# Patient Record
Sex: MALE | Race: OTHER
[De-identification: names, ages, dates, MRNs, and addresses within clinical notes are randomized per-mention and may not be internally consistent; named-entity substitution may affect disease eponyms.]

---

## 2022-01-12 ENCOUNTER — HOSPITAL ENCOUNTER (EMERGENCY)
Dept: HOSPITAL 61 - ER | Age: 47
Discharge: HOME | End: 2022-01-12
Payer: SELF-PAY

## 2022-01-12 VITALS — BODY MASS INDEX: 24.97 KG/M2 | WEIGHT: 127.21 LBS | HEIGHT: 60 IN

## 2022-01-12 VITALS — SYSTOLIC BLOOD PRESSURE: 136 MMHG | DIASTOLIC BLOOD PRESSURE: 81 MMHG

## 2022-01-12 DIAGNOSIS — K59.00: Primary | ICD-10-CM

## 2022-01-12 DIAGNOSIS — E11.9: ICD-10-CM

## 2022-01-12 LAB
ALBUMIN SERPL-MCNC: 3.6 G/DL (ref 3.4–5)
ALBUMIN/GLOB SERPL: 1 {RATIO} (ref 1–1.7)
ALP SERPL-CCNC: 109 U/L (ref 46–116)
ALT SERPL-CCNC: 45 U/L (ref 16–63)
ANION GAP SERPL CALC-SCNC: 15 MMOL/L (ref 6–14)
APTT PPP: YELLOW S
AST SERPL-CCNC: 21 U/L (ref 15–37)
BACTERIA #/AREA URNS HPF: (no result) /HPF
BASE EXCESS ABG: -3 MMOL/L (ref -3–3)
BASOPHILS # BLD AUTO: 0 X10^3/UL (ref 0–0.2)
BASOPHILS NFR BLD: 0 % (ref 0–3)
BILIRUB SERPL-MCNC: 0.7 MG/DL (ref 0.2–1)
BILIRUB UR QL STRIP: NEGATIVE
BUN SERPL-MCNC: 16 MG/DL (ref 8–26)
BUN/CREAT SERPL: 32 (ref 6–20)
CALCIUM SERPL-MCNC: 8.2 MG/DL (ref 8.5–10.1)
CHLORIDE SERPL-SCNC: 98 MMOL/L (ref 98–107)
CO2 SERPL-SCNC: 25 MMOL/L (ref 21–32)
CREAT SERPL-MCNC: 0.5 MG/DL (ref 0.7–1.3)
EOSINOPHIL NFR BLD: 0.1 X10^3/UL (ref 0–0.7)
EOSINOPHIL NFR BLD: 2 % (ref 0–3)
ERYTHROCYTE [DISTWIDTH] IN BLOOD BY AUTOMATED COUNT: 13.2 % (ref 11.5–14.5)
FIBRINOGEN PPP-MCNC: CLEAR MG/DL
GFR SERPLBLD BASED ON 1.73 SQ M-ARVRAT: 179.8 ML/MIN
GLUCOSE SERPL-MCNC: 248 MG/DL (ref 70–99)
HCO3 BLDA-SCNC: 21 MMOL/L (ref 21–28)
HCT VFR BLD CALC: 47.5 % (ref 39–53)
HGB BLD-MCNC: 16.6 G/DL (ref 13–17.5)
INSPIRATION SETTING TIME VENT: 21
LIPASE: 58 U/L (ref 73–393)
LYMPHOCYTES # BLD: 1.1 X10^3/UL (ref 1–4.8)
LYMPHOCYTES NFR BLD AUTO: 25 % (ref 24–48)
MCH RBC QN AUTO: 33 PG (ref 25–35)
MCHC RBC AUTO-ENTMCNC: 35 G/DL (ref 31–37)
MCV RBC AUTO: 93 FL (ref 79–100)
MONO #: 0.4 X10^3/UL (ref 0–1.1)
MONOCYTES NFR BLD: 8 % (ref 0–9)
NEUT #: 2.9 X10^3/UL (ref 1.8–7.7)
NEUTROPHILS NFR BLD AUTO: 65 % (ref 31–73)
NITRITE UR QL STRIP: NEGATIVE
PCO2 BLDA: 35 MMHG (ref 35–46)
PH UR STRIP: 6 [PH]
PLATELET # BLD AUTO: 196 X10^3/UL (ref 140–400)
PO2 BLDA: 100 MMHG (ref 75–108)
POTASSIUM SERPL-SCNC: 4 MMOL/L (ref 3.5–5.1)
PROT SERPL-MCNC: 7.2 G/DL (ref 6.4–8.2)
PROT UR STRIP-MCNC: NEGATIVE MG/DL
RBC # BLD AUTO: 5.09 X10^6/UL (ref 4.3–5.7)
RBC #/AREA URNS HPF: 0 /HPF (ref 0–2)
SAO2 % BLDA: 96 % (ref 92–99)
SODIUM SERPL-SCNC: 138 MMOL/L (ref 136–145)
UROBILINOGEN UR-MCNC: 1 MG/DL
WBC # BLD AUTO: 4.5 X10^3/UL (ref 4–11)
WBC #/AREA URNS HPF: (no result) /HPF (ref 0–4)

## 2022-01-12 PROCEDURE — 36415 COLL VENOUS BLD VENIPUNCTURE: CPT

## 2022-01-12 PROCEDURE — 80053 COMPREHEN METABOLIC PANEL: CPT

## 2022-01-12 PROCEDURE — 82805 BLOOD GASES W/O2 SATURATION: CPT

## 2022-01-12 PROCEDURE — 36600 WITHDRAWAL OF ARTERIAL BLOOD: CPT

## 2022-01-12 PROCEDURE — 99285 EMERGENCY DEPT VISIT HI MDM: CPT

## 2022-01-12 PROCEDURE — 96374 THER/PROPH/DIAG INJ IV PUSH: CPT

## 2022-01-12 PROCEDURE — 85025 COMPLETE CBC W/AUTO DIFF WBC: CPT

## 2022-01-12 PROCEDURE — 81001 URINALYSIS AUTO W/SCOPE: CPT

## 2022-01-12 PROCEDURE — 83690 ASSAY OF LIPASE: CPT

## 2022-01-12 PROCEDURE — 74177 CT ABD & PELVIS W/CONTRAST: CPT

## 2022-01-12 PROCEDURE — 96361 HYDRATE IV INFUSION ADD-ON: CPT

## 2022-01-12 NOTE — ED.ADGEN
General Adult


EDM:


Chief Complaint:  ABDOMINAL PAIN





HPI:


HPI:





Patient is a 46  year old male coming in for right lower quadrant pain that 

started about 2 weeks ago.  Patient states the pain is gradually gotten worse.  

Is worse with palpation and movement.  Denies any nausea, vomiting, diarrhea or 

fevers.  Denies any changes of urination or hematuria.  Denies any medical or 

surgical history.  Last p.o. intake was water around 10 hours prior to 

evaluation, last solid intake 16 hours prior.





Review of Systems:


Review of Systems:


All other systems within normal limits except for as noted in the HPI





Current Medications:





Current Medications








 Medications


  (Trade)  Dose


 Ordered  Sig/Virgilio  Start Time


 Stop Time Status Last Admin


Dose Admin


 


 Info


  (CONTRAST GIVEN


 -- Rx MONITORING)  1 each  PRN DAILY  PRN  22 10:45


 22 10:44   





 


 Iohexol


  (Omnipaque 300


 Mg/ml)  75 ml  1X  ONCE  22 10:45


 22 10:46 DC 22 10:47


75 ML


 


 Ketorolac


 Tromethamine


  (Toradol 30mg


 Vial)  15 mg  1X  ONCE  22 10:30


 22 10:31 DC 22 10:30


15 MG


 


 Sodium Chloride  1,000 ml @ 


 1,000 mls/hr  1X  ONCE  22 10:45


 22 11:44 DC 22 11:54


1,000 MLS/HR











Allergies:


Allergies:





Allergies








Coded Allergies Type Severity Reaction Last Updated Verified


 


  No Known Drug Allergies    22 No











Physical Exam:


PE:


Constitutional: Well developed, well nourished, no acute distress, non-toxic 

appearance. []


HENT: Normocephalic, atraumatic, bilateral external ears normal,  nose normal. 

[]


Eyes: PERRLA, conjunctiva normal, no discharge. [] 


Neck: No rigidity, supple, no stridor. [] 


Cardiovascular: Regular rate and rhythm, brisk cap refill []


Lungs & Thorax: Non labored symmetric respirations, no tachypnea or respiratory 

distress []


Abdomen: Soft, nondistended, but Jean Paul's point tenderness, negative Kiran's, 

guarding.


Skin: Warm, dry, no erythema, no rash. [] 


Back: Unremarkable


Extremities: No deformities, range of motion grossly intact, no lower extremity 

edema [] 


Neurologic: Alert and oriented X 3, no focal deficits noted. []


Psychologic: Affect normal, judgement normal, mood normal. []





Current Patient Data:


Labs:





                                Laboratory Tests








Test


 22


10:00 22


10:02 22


12:40


 


Urine Collection Type Unknown    


 


Urine Color Yellow    


 


Urine Clarity Clear    


 


Urine pH


 6.0 (<5.0-8.0)


 


 





 


Urine Specific Gravity


 >=1.030


(1.000-1.030) 


 





 


Urine Protein


 Negative mg/dL


(NEG-TRACE) 


 





 


Urine Glucose (UA)


 >=1000 mg/dL


(NEG) 


 





 


Urine Ketones (Stick)


 >=80 mg/dL


(NEG) 


 





 


Urine Blood


 Negative (NEG)


 


 





 


Urine Nitrite


 Negative (NEG)


 


 





 


Urine Bilirubin


 Negative (NEG)


 


 





 


Urine Urobilinogen Dipstick


 1.0 mg/dL (0.2


mg/dL) 


 





 


Urine Leukocyte Esterase


 Negative (NEG)


 


 





 


Urine RBC 0 /HPF (0-2)    


 


Urine WBC


 1-4 /HPF (0-4)


 


 





 


Urine Squamous Epithelial


Cells Few /LPF  


 


 





 


Urine Bacteria


 Few /HPF


(0-FEW) 


 





 


White Blood Count


 


 4.5 x10^3/uL


(4.0-11.0) 





 


Red Blood Count


 


 5.09 x10^6/uL


(4.30-5.70) 





 


Hemoglobin


 


 16.6 g/dL


(13.0-17.5) 





 


Hematocrit


 


 47.5 %


(39.0-53.0) 





 


Mean Corpuscular Volume


 


 93 fL ()


 





 


Mean Corpuscular Hemoglobin  33 pg (25-35)   


 


Mean Corpuscular Hemoglobin


Concent 


 35 g/dL


(31-37) 





 


Red Cell Distribution Width


 


 13.2 %


(11.5-14.5) 





 


Platelet Count


 


 196 x10^3/uL


(140-400) 





 


Neutrophils (%) (Auto)  65 % (31-73)   


 


Lymphocytes (%) (Auto)  25 % (24-48)   


 


Monocytes (%) (Auto)  8 % (0-9)   


 


Eosinophils (%) (Auto)  2 % (0-3)   


 


Basophils (%) (Auto)  0 % (0-3)   


 


Neutrophils # (Auto)


 


 2.9 x10^3/uL


(1.8-7.7) 





 


Lymphocytes # (Auto)


 


 1.1 x10^3/uL


(1.0-4.8) 





 


Monocytes # (Auto)


 


 0.4 x10^3/uL


(0.0-1.1) 





 


Eosinophils # (Auto)


 


 0.1 x10^3/uL


(0.0-0.7) 





 


Basophils # (Auto)


 


 0.0 x10^3/uL


(0.0-0.2) 





 


Sodium Level


 


 138 mmol/L


(136-145) 





 


Potassium Level


 


 4.0 mmol/L


(3.5-5.1) 





 


Chloride Level


 


 98 mmol/L


() 





 


Carbon Dioxide Level


 


 25 mmol/L


(21-32) 





 


Anion Gap  15 (6-14)  H 


 


Blood Urea Nitrogen


 


 16 mg/dL


(8-26) 





 


Creatinine


 


 0.5 mg/dL


(0.7-1.3)  L 





 


Estimated GFR


(Cockcroft-Gault) 


 179.8  


 





 


BUN/Creatinine Ratio  32 (6-20)  H 


 


Glucose Level


 


 248 mg/dL


(70-99)  H 





 


Calcium Level


 


 8.2 mg/dL


(8.5-10.1)  L 





 


Total Bilirubin


 


 0.7 mg/dL


(0.2-1.0) 





 


Aspartate Amino Transferase


(AST) 


 21 U/L (15-37)


 





 


Alanine Aminotransferase (ALT)


 


 45 U/L (16-63)


 





 


Alkaline Phosphatase


 


 109 U/L


() 





 


Total Protein


 


 7.2 g/dL


(6.4-8.2) 





 


Albumin


 


 3.6 g/dL


(3.4-5.0) 





 


Albumin/Globulin Ratio  1.0 (1.0-1.7)   


 


Lipase


 


 58 U/L


()  L 





 


O2 Saturation   96 % (92-99)  


 


Arterial Blood pH


 


 


 7.40


(7.35-7.45)


 


Arterial Blood pCO2 at


Patient Temp 


 


 35 mmHg


(35-46)


 


Arterial Blood pO2 at Patient


Temp 


 


 100 mmHg


()


 


Arterial Blood HCO3


 


 


 21 mmol/L


(21-28)


 


Arterial Blood Base Excess


 


 


 -3 mmol/L


(-3-3)


 


FiO2   21  





                                Laboratory Tests


22 10:02








                                Laboratory Tests


22 10:02








Vital Signs:





                                   Vital Signs








  Date Time  Temp Pulse Resp B/P (MAP) Pulse Ox O2 Delivery O2 Flow Rate FiO2


 


22 09:50 97.8 87 14 157/90 (112) 100 Room Air  





 97.8       











EKG:


EKG:


[]





Heart Score:


C/O Chest Pain:  No


Risk Factors:


Risk Factors:  DM, Current or recent (<one month) smoker, HTN, HLP, family 

history of CAD, obesity.


Risk Scores:


Score 0 - 3:  2.5% MACE over next 6 weeks - Discharge Home


Score 4 - 6:  20.3% MACE over next 6 weeks - Admit for Clinical Observation


Score 7 - 10:  72.7% MACE over next 6 weeks - Early Invasive Strategies





Radiology/Procedures:


Radiology/Procedures:


[facility]


                    [address] [place] [address]


                                 [phone]


                                        


                                 IMAGING REPORT





                                     Signed





PATIENT: LUCIANA ELENA RACCOUNT: [id number]     MRN#: [id number]


: 1975           LOCATION: ER              AGE: 46


SEX: M                    EXAM DT: 22         ACCESSION#: [id number]


STATUS: REG ER            ORD. PHYSICIAN: GEORGE LOVE MD


REASON: RLQ pain


PROCEDURE: CT ABD PELV W/ IV CONTRST ONLY





Exam Date:  2022 10:33 AM





CT ABDOMEN+PELVIS W





Indication: Reason: RLQ pain / Spl. Instructions: omni 300 75ml / History: .





TECHNIQUE:  CT examination of the abdomen and pelvis was performed following the

 administration of nonionic intravenous contrast.  One or more of the following 

dose reduction techniques were utilized:


*Automated exposure control (AEC)


*Adjustment of mA and/or kV according to patient size


*Use of iterative reconstruction technique


*CT scan done according to ALARA, or ALARA/IMAGE GENTLY





FINDINGS: 





The visualized lung bases are clear.    





The liver, gallbladder, spleen, pancreas, adrenal glands and kidneys are normal.





Urinary bladder is normal in appearance.  





There is no bowel obstruction or inflammation.  The appendix is normal.  





Mild atherosclerotic calcifications are seen.  No lymphadenopathy or ascites is 

seen.  





Degenerative changes are seen in the spine.





IMPRESSION: 





No evidence of acute intra-abdominal pathology.  





Electronically signed by: Tete Cabrera MD (2022 11:01 AM) [facility]














DICTATED and SIGNED BY:     TETE CABRERA MD


DATE:     22 [id number] 0


[]





Course & Med Decision Making:


Course & Med Decision Making


Pertinent Labs and Imaging studies reviewed. (See chart for details)


Patient has fasting blood sugar of 250, discussed new onset diabetes with 

patient will start metformin.  Given information had a follow-up with clinics.  

CT unremarkable but has large stool burden in the ascending colon, could be 

contributing to patient's right lower quadrant pain.


[]





Dragon Disclaimer:


Dragon Disclaimer:


This electronic medical record was generated, in whole or in part, using a voice

 recognition dictation system.





Departure


Departure


Impression:  


   Primary Impression:  


   Diabetes mellitus, new onset


   Additional Impression:  


   Constipation


Disposition:   HOME / SELF CARE / HOMELESS


Condition:  STABLE


Referrals:  


NO PCP (PCP)


Patient Instructions:  Abdominal Pain, Possible Early Appendicitis, Metformin 

tablets


Scripts


Docusate Sodium (DOCUSATE SODIUM) 100 Mg Capsule


1 CAP PO BID for constipation for 7 Days, #14 CAP 0 Refills


   Prov: GEORGE LOVE MD         22 


Metformin Hcl (METFORMIN HCL) 500 Mg Tablet


500 MG PO BIDWMEALS for ANTI-DIABETIC, #60 TAB 0 Refills


   Prov: GEORGE LOVE MD         22





Problem Qualifiers











GEORGE LOVE MD            2022 10:08

## 2022-01-12 NOTE — RAD
Exam Date:  1/12/2022 10:33 AM



CT ABDOMEN+PELVIS W



Indication: Reason: RLQ pain / Spl. Instructions: omni 300 75ml / History: .



TECHNIQUE:  CT examination of the abdomen and pelvis was performed following the administration of no
nionic intravenous contrast.  One or more of the following dose reduction techniques were utilized:

*Automated exposure control (AEC)

*Adjustment of mA and/or kV according to patient size

*Use of iterative reconstruction technique

*CT scan done according to ALARA, or ALARA/IMAGE GENTLY



FINDINGS: 



The visualized lung bases are clear.    



The liver, gallbladder, spleen, pancreas, adrenal glands and kidneys are normal.



Urinary bladder is normal in appearance.  



There is no bowel obstruction or inflammation.  The appendix is normal.  



Mild atherosclerotic calcifications are seen.  No lymphadenopathy or ascites is seen.  



Degenerative changes are seen in the spine.



IMPRESSION: 



No evidence of acute intra-abdominal pathology.  



Electronically signed by: Shane Cabrera MD (1/12/2022 11:01 AM) [facility]